# Patient Record
Sex: MALE | Race: BLACK OR AFRICAN AMERICAN | Employment: OTHER | URBAN - METROPOLITAN AREA
[De-identification: names, ages, dates, MRNs, and addresses within clinical notes are randomized per-mention and may not be internally consistent; named-entity substitution may affect disease eponyms.]

---

## 2018-08-02 PROBLEM — Z86.39 HISTORY OF DIABETES MELLITUS: Status: ACTIVE | Noted: 2017-08-17

## 2018-08-02 PROBLEM — I10 ESSENTIAL HYPERTENSION: Status: ACTIVE | Noted: 2018-05-05

## 2018-08-02 PROBLEM — H93.19 TINNITUS: Status: ACTIVE | Noted: 2017-08-17

## 2018-08-02 PROBLEM — R71.8 RBC MICROCYTOSIS: Status: ACTIVE | Noted: 2017-03-15

## 2018-08-02 PROBLEM — D50.9 MICROCYTIC ANEMIA: Status: ACTIVE | Noted: 2018-05-02

## 2018-08-02 PROBLEM — S76.102S: Status: ACTIVE | Noted: 2018-05-11

## 2018-08-02 PROBLEM — I42.2 ASYMMETRIC SEPTAL HYPERTROPHY (HCC): Status: ACTIVE | Noted: 2018-05-23

## 2018-08-02 PROBLEM — Z98.84 HISTORY OF GASTRIC BYPASS: Status: ACTIVE | Noted: 2018-05-02

## 2018-08-02 PROBLEM — Z86.39 HISTORY OF SECONDARY HYPERPARATHYROIDISM: Status: ACTIVE | Noted: 2018-05-02

## 2018-08-02 PROBLEM — E55.9 VITAMIN D DEFICIENCY: Status: ACTIVE | Noted: 2018-05-02

## 2018-08-02 PROBLEM — R73.03 PREDIABETES: Status: ACTIVE | Noted: 2017-08-17

## 2018-08-02 PROBLEM — Z96.651 HISTORY OF TOTAL KNEE REPLACEMENT, RIGHT: Status: ACTIVE | Noted: 2018-05-02

## 2018-08-02 PROBLEM — I51.7 BIATRIAL ENLARGEMENT: Status: ACTIVE | Noted: 2018-05-23

## 2018-08-02 PROBLEM — N25.81 SECONDARY RENAL HYPERPARATHYROIDISM (HCC): Status: ACTIVE | Noted: 2017-11-16

## 2018-08-02 PROBLEM — I27.20 MILD PULMONARY HYPERTENSION (HCC): Status: ACTIVE | Noted: 2018-05-23

## 2018-08-02 PROBLEM — F32.5 MAJOR DEPRESSIVE DISORDER IN FULL REMISSION (HCC): Status: ACTIVE | Noted: 2017-08-17

## 2018-08-02 PROBLEM — N20.0 NEPHROLITHIASIS: Status: ACTIVE | Noted: 2017-08-17

## 2018-08-02 PROBLEM — R55 SYNCOPE: Status: ACTIVE | Noted: 2018-05-05

## 2018-08-02 PROBLEM — F14.11 HISTORY OF COCAINE ABUSE (HCC): Status: ACTIVE | Noted: 2018-05-02

## 2018-08-02 PROBLEM — Z86.79 HISTORY OF ATRIAL FLUTTER: Status: ACTIVE | Noted: 2018-05-02

## 2018-08-02 PROBLEM — I36.1 NON-RHEUMATIC TRICUSPID VALVE INSUFFICIENCY: Status: ACTIVE | Noted: 2018-05-23

## 2018-08-02 PROBLEM — F12.90 MARIJUANA USE: Status: ACTIVE | Noted: 2017-03-15

## 2018-08-02 PROBLEM — Z87.891 HISTORY OF TOBACCO ABUSE: Status: ACTIVE | Noted: 2018-05-02

## 2018-08-02 PROBLEM — F10.11 HISTORY OF ALCOHOL ABUSE: Status: ACTIVE | Noted: 2018-05-02

## 2018-08-02 PROBLEM — G44.209 TENSION TYPE HEADACHE: Status: ACTIVE | Noted: 2017-08-17

## 2018-08-02 PROBLEM — E11.9 CONTROLLED TYPE 2 DIABETES MELLITUS WITHOUT COMPLICATION, WITHOUT LONG-TERM CURRENT USE OF INSULIN (HCC): Status: ACTIVE | Noted: 2018-05-02

## 2018-08-02 PROBLEM — F10.10 ALCOHOL ABUSE: Status: ACTIVE | Noted: 2018-05-05

## 2018-08-02 PROBLEM — R80.9 URINE TEST POSITIVE FOR MICROALBUMINURIA: Status: ACTIVE | Noted: 2018-05-03

## 2018-08-02 PROBLEM — D35.00 ADRENAL ADENOMA: Status: ACTIVE | Noted: 2018-05-11

## 2019-07-16 PROBLEM — I48.0 PAROXYSMAL ATRIAL FIBRILLATION (HCC): Status: ACTIVE | Noted: 2019-01-21

## 2019-07-16 PROBLEM — C64.2 RENAL CELL CARCINOMA OF LEFT KIDNEY (HCC): Status: ACTIVE | Noted: 2019-03-27

## 2019-07-16 PROBLEM — E61.1 IRON DEFICIENCY: Status: ACTIVE | Noted: 2019-02-08

## 2020-07-20 ENCOUNTER — HOSPITAL ENCOUNTER (OUTPATIENT)
Dept: PREADMISSION TESTING | Age: 70
Discharge: HOME | End: 2020-07-20
Payer: MEDICARE

## 2020-07-20 DIAGNOSIS — Z01.818 PRE-OP TESTING: ICD-10-CM

## 2020-07-20 PROCEDURE — 87635 SARS-COV-2 COVID-19 AMP PRB: CPT

## 2020-07-21 NOTE — PERIOP NOTES
PAT - SURGICAL PRE-ADMISSION INSTRUCTIONS    NAME:  Kale Wall                                                          TODAY'S DATE:  7/21/2020    SURGERY DATE:  7/23/2020                                  SURGERY ARRIVAL TIME:   1200 TBV    1. Do NOT eat or drink anything, including candy or gum, after MIDNIGHT on 7/22/20 , unless you have specific instructions from your Surgeon or Anesthesia Provider to do so. 2. No smoking on the day of surgery. 3. No alcohol 24 hours prior to the day of surgery. 4. No recreational drugs for one week prior to the day of surgery. 5. Leave all valuables, including money/purse, at home. 6. Remove all jewelry, nail polish, makeup (including mascara); no lotions, powders, deodorant, or perfume/cologne/after shave. 7. Glasses/Contact lenses and Dentures may be worn to the hospital.  They will be removed prior to surgery. 8. Call your doctor if symptoms of a cold or illness develop within 24 ours prior to surgery. 9. AN ADULT MUST DRIVE YOU HOME AFTER OUTPATIENT SURGERY. 10. If you are having an OUTPATIENT procedure, please make arrangements for a responsible adult to be with you for 24 hours after your surgery. 11. If you are admitted to the hospital, you will be assigned to a bed after surgery is complete. Normally a family member will not be able to see you until you are in your assigned bed. 15. Family is encouraged to accompany you to the hospital.  We ask visitors in the treatment area to be limited to ONE person at a time to ensure patient privacy. EXCEPTIONS WILL BE MADE AS NEEDED. 15. Children under 12 are discouraged from entering the treatment area and need to be supervised by an adult when in the waiting room. Special Instructions:     Take these medications the morning of surgery with a sip of water:  AMLODIPINE, PRILOSEC, PROZAC, STOP anticoagulants AT LEAST 1 WEEK PRIOR to your surgery or, follow other MD instructions:  COUMADIN    Patient Prep:    shower with anti-bacterial soap    These surgical instructions were reviewed with PATIENT'S WIFE during the PAT PHONE CALL. Directions: On the morning of surgery, please go to the MAIN ENTRANCE. Sign in at the Registration Desk.     If you have any questions and/or concerns, please do not hesitate to call:  (Prior to the day of surgery)  [facility] unit:  [phone]  (Day of surgery)  [facility] unit:  [phone]

## 2020-07-22 LAB — SARS-COV-2, COV2NT: NOT DETECTED

## 2020-07-23 ENCOUNTER — APPOINTMENT (OUTPATIENT)
Dept: GENERAL RADIOLOGY | Age: 70
End: 2020-07-23
Attending: UROLOGY
Payer: MEDICARE

## 2020-07-23 ENCOUNTER — ANESTHESIA EVENT (OUTPATIENT)
Dept: SURGERY | Age: 70
End: 2020-07-23
Payer: MEDICARE

## 2020-07-23 ENCOUNTER — HOSPITAL ENCOUNTER (OUTPATIENT)
Age: 70
Setting detail: OUTPATIENT SURGERY
Discharge: HOME | End: 2020-07-23
Attending: UROLOGY | Admitting: UROLOGY
Payer: MEDICARE

## 2020-07-23 ENCOUNTER — ANESTHESIA (OUTPATIENT)
Dept: SURGERY | Age: 70
End: 2020-07-23
Payer: MEDICARE

## 2020-07-23 VITALS
RESPIRATION RATE: 24 BRPM | DIASTOLIC BLOOD PRESSURE: 80 MMHG | OXYGEN SATURATION: 95 % | HEART RATE: 62 BPM | HEIGHT: 68 IN | SYSTOLIC BLOOD PRESSURE: 133 MMHG | TEMPERATURE: 98.7 F | BODY MASS INDEX: 30.21 KG/M2 | WEIGHT: 199.31 LBS

## 2020-07-23 DIAGNOSIS — N20.0 NEPHROLITHIASIS: Primary | ICD-10-CM

## 2020-07-23 LAB
INR PPP: 1.2 (ref 0.8–1.2)
PROTHROMBIN TIME: 15.3 SEC (ref 11.5–15.2)

## 2020-07-23 PROCEDURE — C1894 INTRO/SHEATH, NON-LASER: HCPCS | Performed by: UROLOGY

## 2020-07-23 PROCEDURE — 74011250636 HC RX REV CODE- 250/636: Performed by: UROLOGY

## 2020-07-23 PROCEDURE — 74011250636 HC RX REV CODE- 250/636: Performed by: NURSE ANESTHETIST, CERTIFIED REGISTERED

## 2020-07-23 PROCEDURE — 77030032490 HC SLV COMPR SCD KNE COVD -B: Performed by: UROLOGY

## 2020-07-23 PROCEDURE — 77030008477 HC STYL SATN SLP COVD -A: Performed by: ANESTHESIOLOGY

## 2020-07-23 PROCEDURE — 76210000063 HC OR PH I REC FIRST 0.5 HR: Performed by: UROLOGY

## 2020-07-23 PROCEDURE — 85610 PROTHROMBIN TIME: CPT

## 2020-07-23 PROCEDURE — 74011000250 HC RX REV CODE- 250: Performed by: NURSE ANESTHETIST, CERTIFIED REGISTERED

## 2020-07-23 PROCEDURE — 74011636320 HC RX REV CODE- 636/320: Performed by: UROLOGY

## 2020-07-23 PROCEDURE — 76210000020 HC REC RM PH II FIRST 0.5 HR: Performed by: UROLOGY

## 2020-07-23 PROCEDURE — 76010000162 HC OR TIME 1.5 TO 2 HR INTENSV-TIER 1: Performed by: UROLOGY

## 2020-07-23 PROCEDURE — 77030008683 HC TU ET CUF COVD -A: Performed by: ANESTHESIOLOGY

## 2020-07-23 PROCEDURE — 74420 UROGRAPHY RTRGR +-KUB: CPT

## 2020-07-23 PROCEDURE — C1758 CATHETER, URETERAL: HCPCS | Performed by: UROLOGY

## 2020-07-23 PROCEDURE — C1769 GUIDE WIRE: HCPCS | Performed by: UROLOGY

## 2020-07-23 PROCEDURE — 77030013079 HC BLNKT BAIR HGGR 3M -A: Performed by: ANESTHESIOLOGY

## 2020-07-23 PROCEDURE — 74011250636 HC RX REV CODE- 250/636: Performed by: ANESTHESIOLOGY

## 2020-07-23 PROCEDURE — 76060000034 HC ANESTHESIA 1.5 TO 2 HR: Performed by: UROLOGY

## 2020-07-23 PROCEDURE — 74011000272 HC RX REV CODE- 272: Performed by: UROLOGY

## 2020-07-23 PROCEDURE — C1892 INTRO/SHEATH,FIXED,PEEL-AWAY: HCPCS | Performed by: UROLOGY

## 2020-07-23 PROCEDURE — 77030018836 HC SOL IRR NACL ICUM -A: Performed by: UROLOGY

## 2020-07-23 PROCEDURE — 74011000258 HC RX REV CODE- 258: Performed by: UROLOGY

## 2020-07-23 PROCEDURE — C2617 STENT, NON-COR, TEM W/O DEL: HCPCS | Performed by: UROLOGY

## 2020-07-23 RX ORDER — LIDOCAINE HYDROCHLORIDE 20 MG/ML
INJECTION, SOLUTION EPIDURAL; INFILTRATION; INTRACAUDAL; PERINEURAL AS NEEDED
Status: DISCONTINUED | OUTPATIENT
Start: 2020-07-23 | End: 2020-07-23 | Stop reason: HOSPADM

## 2020-07-23 RX ORDER — DEXAMETHASONE SODIUM PHOSPHATE 4 MG/ML
INJECTION, SOLUTION INTRA-ARTICULAR; INTRALESIONAL; INTRAMUSCULAR; INTRAVENOUS; SOFT TISSUE AS NEEDED
Status: DISCONTINUED | OUTPATIENT
Start: 2020-07-23 | End: 2020-07-23 | Stop reason: HOSPADM

## 2020-07-23 RX ORDER — DOCUSATE SODIUM 100 MG/1
100 CAPSULE, LIQUID FILLED ORAL
Qty: 60 CAP | Refills: 2 | Status: SHIPPED | OUTPATIENT
Start: 2020-07-23 | End: 2020-10-21

## 2020-07-23 RX ORDER — ONDANSETRON 2 MG/ML
4 INJECTION INTRAMUSCULAR; INTRAVENOUS
Status: DISCONTINUED | OUTPATIENT
Start: 2020-07-23 | End: 2020-07-23 | Stop reason: HOSPADM

## 2020-07-23 RX ORDER — MIDAZOLAM HYDROCHLORIDE 1 MG/ML
INJECTION, SOLUTION INTRAMUSCULAR; INTRAVENOUS AS NEEDED
Status: DISCONTINUED | OUTPATIENT
Start: 2020-07-23 | End: 2020-07-23 | Stop reason: HOSPADM

## 2020-07-23 RX ORDER — OXYCODONE AND ACETAMINOPHEN 5; 325 MG/1; MG/1
1-2 TABLET ORAL
Qty: 10 TAB | Refills: 0 | Status: SHIPPED | OUTPATIENT
Start: 2020-07-23 | End: 2020-08-02

## 2020-07-23 RX ORDER — SODIUM CHLORIDE, SODIUM LACTATE, POTASSIUM CHLORIDE, CALCIUM CHLORIDE 600; 310; 30; 20 MG/100ML; MG/100ML; MG/100ML; MG/100ML
INJECTION, SOLUTION INTRAVENOUS
Status: DISCONTINUED | OUTPATIENT
Start: 2020-07-23 | End: 2020-07-23 | Stop reason: HOSPADM

## 2020-07-23 RX ORDER — SODIUM CHLORIDE, SODIUM LACTATE, POTASSIUM CHLORIDE, CALCIUM CHLORIDE 600; 310; 30; 20 MG/100ML; MG/100ML; MG/100ML; MG/100ML
125 INJECTION, SOLUTION INTRAVENOUS ONCE
Status: COMPLETED | OUTPATIENT
Start: 2020-07-23 | End: 2020-07-23

## 2020-07-23 RX ORDER — FENTANYL CITRATE 50 UG/ML
50 INJECTION, SOLUTION INTRAMUSCULAR; INTRAVENOUS
Status: DISCONTINUED | OUTPATIENT
Start: 2020-07-23 | End: 2020-07-23 | Stop reason: HOSPADM

## 2020-07-23 RX ORDER — ROCURONIUM BROMIDE 10 MG/ML
INJECTION, SOLUTION INTRAVENOUS AS NEEDED
Status: DISCONTINUED | OUTPATIENT
Start: 2020-07-23 | End: 2020-07-23 | Stop reason: HOSPADM

## 2020-07-23 RX ORDER — FENTANYL CITRATE 50 UG/ML
INJECTION, SOLUTION INTRAMUSCULAR; INTRAVENOUS AS NEEDED
Status: DISCONTINUED | OUTPATIENT
Start: 2020-07-23 | End: 2020-07-23 | Stop reason: HOSPADM

## 2020-07-23 RX ORDER — ONDANSETRON 2 MG/ML
INJECTION INTRAMUSCULAR; INTRAVENOUS AS NEEDED
Status: DISCONTINUED | OUTPATIENT
Start: 2020-07-23 | End: 2020-07-23 | Stop reason: HOSPADM

## 2020-07-23 RX ORDER — SODIUM CHLORIDE, SODIUM LACTATE, POTASSIUM CHLORIDE, CALCIUM CHLORIDE 600; 310; 30; 20 MG/100ML; MG/100ML; MG/100ML; MG/100ML
75 INJECTION, SOLUTION INTRAVENOUS CONTINUOUS
Status: DISCONTINUED | OUTPATIENT
Start: 2020-07-23 | End: 2020-07-23 | Stop reason: HOSPADM

## 2020-07-23 RX ORDER — PROPOFOL 10 MG/ML
INJECTION, EMULSION INTRAVENOUS AS NEEDED
Status: DISCONTINUED | OUTPATIENT
Start: 2020-07-23 | End: 2020-07-23 | Stop reason: HOSPADM

## 2020-07-23 RX ORDER — SUCCINYLCHOLINE CHLORIDE 100 MG/5ML
SYRINGE (ML) INTRAVENOUS AS NEEDED
Status: DISCONTINUED | OUTPATIENT
Start: 2020-07-23 | End: 2020-07-23 | Stop reason: HOSPADM

## 2020-07-23 RX ADMIN — DEXAMETHASONE SODIUM PHOSPHATE 8 MG: 4 INJECTION, SOLUTION INTRA-ARTICULAR; INTRALESIONAL; INTRAMUSCULAR; INTRAVENOUS; SOFT TISSUE at 16:33

## 2020-07-23 RX ADMIN — SODIUM CHLORIDE, SODIUM LACTATE, POTASSIUM CHLORIDE, AND CALCIUM CHLORIDE: 600; 310; 30; 20 INJECTION, SOLUTION INTRAVENOUS at 16:21

## 2020-07-23 RX ADMIN — Medication 80 MG: at 16:27

## 2020-07-23 RX ADMIN — PROPOFOL 50 MG: 10 INJECTION, EMULSION INTRAVENOUS at 17:11

## 2020-07-23 RX ADMIN — ONDANSETRON 4 MG: 2 SOLUTION INTRAMUSCULAR; INTRAVENOUS at 17:44

## 2020-07-23 RX ADMIN — SUGAMMADEX 181 MG: 100 INJECTION, SOLUTION INTRAVENOUS at 17:45

## 2020-07-23 RX ADMIN — ROCURONIUM BROMIDE 20 MG: 10 SOLUTION INTRAVENOUS at 16:53

## 2020-07-23 RX ADMIN — ROCURONIUM BROMIDE 30 MG: 10 SOLUTION INTRAVENOUS at 17:11

## 2020-07-23 RX ADMIN — SODIUM CHLORIDE, SODIUM LACTATE, POTASSIUM CHLORIDE, AND CALCIUM CHLORIDE 125 ML/HR: 600; 310; 30; 20 INJECTION, SOLUTION INTRAVENOUS at 12:04

## 2020-07-23 RX ADMIN — ROCURONIUM BROMIDE 30 MG: 10 SOLUTION INTRAVENOUS at 16:34

## 2020-07-23 RX ADMIN — MIDAZOLAM 2 MG: 1 INJECTION INTRAMUSCULAR; INTRAVENOUS at 16:21

## 2020-07-23 RX ADMIN — GENTAMICIN SULFATE 400 MG: 40 INJECTION, SOLUTION INTRAMUSCULAR; INTRAVENOUS at 16:35

## 2020-07-23 RX ADMIN — SODIUM CHLORIDE, SODIUM LACTATE, POTASSIUM CHLORIDE, AND CALCIUM CHLORIDE: 600; 310; 30; 20 INJECTION, SOLUTION INTRAVENOUS at 17:26

## 2020-07-23 RX ADMIN — LIDOCAINE HYDROCHLORIDE 80 MG: 20 INJECTION, SOLUTION INTRAVENOUS at 16:27

## 2020-07-23 RX ADMIN — FENTANYL CITRATE 50 MCG: 50 INJECTION, SOLUTION INTRAMUSCULAR; INTRAVENOUS at 16:26

## 2020-07-23 RX ADMIN — FENTANYL CITRATE 50 MCG: 50 INJECTION, SOLUTION INTRAMUSCULAR; INTRAVENOUS at 16:21

## 2020-07-23 RX ADMIN — FENTANYL CITRATE 100 MCG: 50 INJECTION, SOLUTION INTRAMUSCULAR; INTRAVENOUS at 17:12

## 2020-07-23 RX ADMIN — PROPOFOL 150 MG: 10 INJECTION, EMULSION INTRAVENOUS at 16:27

## 2020-07-23 NOTE — PERIOP NOTES
Pre-Op Summary    Pt arrived via car with family/friend and is oriented to time, place, person and situation. Patient with steady gait with none assistive devices. Visit Vitals  /84 (BP 1 Location: Left arm, BP Patient Position: At rest)   Pulse 62   Temp 98.4 °F (36.9 °C)   Resp 18   Ht 5' 8\" (1.727 m)   Wt 90.4 kg (199 lb 5 oz)   SpO2 100%   BMI 30.31 kg/m²       Peripheral IV located on Left wrist .    Patients belongings are locked up on [facility]. Patient's point of contact is his wife Keren Mccrary and their contact number is: [phone]. They will be leaving and coming back. They are able to receive medication information. They will be their ride home.

## 2020-07-23 NOTE — BRIEF OP NOTE
Brief Postoperative Note    Patient: Campbell Coffman  YOB: 1950  MRN: [id number]    Date of Procedure: 7/23/2020     Pre-Op Diagnosis: Kidney Stone N20.0    Post-Op Diagnosis: Same as preoperative diagnosis. Procedure(s):  Cystoscopy, Left Retrograde Pyelogram, Left Ureteroscopy Laser Lithotripsy, Left Stent Placement    Surgeon:   Anirudh Recinos MD    Assistant: Neisha Urias MD, RES    Anesthesia: General     Estimated Blood Loss (mL): Minimal    Complications: None    Specimens:None    Implants:   Implant Name Type Inv.  Item Serial No.  Lot No. LRB No. Used Action   [employer] [id number]    [employer] [id number] Left 1 Implanted       Drains: None    Findings: Per OP report    Electronically Signed by Kezia Knowles MD on 7/23/2020 at 5:55 PM

## 2020-07-23 NOTE — ANESTHESIA PREPROCEDURE EVALUATION
Relevant Problems   No relevant active problems       Anesthetic History               Review of Systems / Medical History  Patient summary reviewed and pertinent labs reviewed    Pulmonary    COPD: moderate    Sleep apnea: CPAP           Neuro/Psych              Cardiovascular    Hypertension: well controlled        Dysrhythmias            GI/Hepatic/Renal     GERD: well controlled    Renal disease: stones       Endo/Other      Hypothyroidism  Morbid obesity and cancer     Other Findings              Physical Exam    Airway  Mallampati: III  TM Distance: 4 - 6 cm  Neck ROM: decreased range of motion   Mouth opening: Diminished (comment)     Cardiovascular    Rhythm: regular  Rate: normal         Dental    Dentition: Poor dentition     Pulmonary  Breath sounds clear to auscultation               Abdominal         Other Findings            Anesthetic Plan    ASA: 3  Anesthesia type: general          Induction: Intravenous  Anesthetic plan and risks discussed with: Patient

## 2020-07-23 NOTE — PERIOP NOTES
Transferred to PACU holding to await surgery. Seen by Dr. Leeann Santana. Report given to ERICH Scherer RN. LAst Coumadin dose 7/20/2020.

## 2020-07-23 NOTE — DISCHARGE INSTRUCTIONS
Patient Education        Kidney Stone: Care Instructions  Your Care Instructions     Kidney stones are formed when salts, minerals, and other substances normally found in the urine clump together. They can be as small as grains of sand or, rarely, as large as golf balls. While the stone is traveling through the ureter, which is the tube that carries urine from the kidney to the bladder, you will probably feel pain. The pain may be mild or very severe. You may also have some blood in your urine. As soon as the stone reaches the bladder, any intense pain should go away. If a stone is too large to pass on its own, you may need a medical procedure to help you pass the stone. The doctor has checked you carefully, but problems can develop later. If you notice any problems or new symptoms, get medical treatment right away. Follow-up care is a key part of your treatment and safety. Be sure to make and go to all appointments, and call your doctor if you are having problems. It's also a good idea to know your test results and keep a list of the medicines you take. How can you care for yourself at home? · Drink plenty of fluids, enough so that your urine is light yellow or clear like water. If you have kidney, heart, or liver disease and have to limit fluids, talk with your doctor before you increase the amount of fluids you drink. · Take pain medicines exactly as directed. Call your doctor if you think you are having a problem with your medicine. ? If the doctor gave you a prescription medicine for pain, take it as prescribed. ? If you are not taking a prescription pain medicine, ask your doctor if you can take an over-the-counter medicine. Read and follow all instructions on the label. · Your doctor may ask you to strain your urine so that you can collect your kidney stone when it passes. You can use a kitchen strainer or a tea strainer to catch the stone.  Store it in a plastic bag until you see your doctor again.  Preventing future kidney stones  Some changes in your diet may help prevent kidney stones. Depending on the cause of your stones, your doctor may recommend that you:  · Drink plenty of fluids, enough so that your urine is light yellow or clear like water. If you have kidney, heart, or liver disease and have to limit fluids, talk with your doctor before you increase the amount of fluids you drink. · Limit coffee, tea, and alcohol. Also avoid grapefruit juice. · Do not take more than the recommended daily dose of vitamins C and D.  · Avoid antacids such as Gaviscon, Maalox, Mylanta, or Tums. · Limit the amount of salt (sodium) in your diet. · Eat a balanced diet that is not too high in protein. · Limit foods that are high in a substance called oxalate, which can cause kidney stones. These foods include dark green vegetables, rhubarb, chocolate, wheat bran, nuts, cranberries, and beans. When should you call for help? Call your doctor now or seek immediate medical care if:  · You cannot keep down fluids. · Your pain gets worse. · You have a fever or chills. · You have new or worse pain in your back just below your rib cage (the flank area). · You have new or more blood in your urine. Watch closely for changes in your health, and be sure to contact your doctor if:    Patient Education        Kidney Stone: Care Instructions  Your Care Instructions     Kidney stones are formed when salts, minerals, and other substances normally found in the urine clump together. They can be as small as grains of sand or, rarely, as large as golf balls. While the stone is traveling through the ureter, which is the tube that carries urine from the kidney to the bladder, you will probably feel pain. The pain may be mild or very severe. You may also have some blood in your urine. As soon as the stone reaches the bladder, any intense pain should go away.   If a stone is too large to pass on its own, you may need a medical procedure to help you pass the stone. The doctor has checked you carefully, but problems can develop later. If you notice any problems or new symptoms, get medical treatment right away. Follow-up care is a key part of your treatment and safety. Be sure to make and go to all appointments, and call your doctor if you are having problems. It's also a good idea to know your test results and keep a list of the medicines you take. How can you care for yourself at home? · Drink plenty of fluids, enough so that your urine is light yellow or clear like water. If you have kidney, heart, or liver disease and have to limit fluids, talk with your doctor before you increase the amount of fluids you drink. · Take pain medicines exactly as directed. Call your doctor if you think you are having a problem with your medicine. ? If the doctor gave you a prescription medicine for pain, take it as prescribed. ? If you are not taking a prescription pain medicine, ask your doctor if you can take an over-the-counter medicine. Read and follow all instructions on the label. · Your doctor may ask you to strain your urine so that you can collect your kidney stone when it passes. You can use a kitchen strainer or a tea strainer to catch the stone. Store it in a plastic bag until you see your doctor again. Preventing future kidney stones  Some changes in your diet may help prevent kidney stones. Depending on the cause of your stones, your doctor may recommend that you:  · Drink plenty of fluids, enough so that your urine is light yellow or clear like water. If you have kidney, heart, or liver disease and have to limit fluids, talk with your doctor before you increase the amount of fluids you drink. · Limit coffee, tea, and alcohol. Also avoid grapefruit juice. · Do not take more than the recommended daily dose of vitamins C and D.  · Avoid antacids such as Gaviscon, Maalox, Mylanta, or Tums.   · Limit the amount of salt (sodium) in your diet.  · Eat a balanced diet that is not too high in protein. · Limit foods that are high in a substance called oxalate, which can cause kidney stones. These foods include dark green vegetables, rhubarb, chocolate, wheat bran, nuts, cranberries, and beans. When should you call for help? Call your doctor now or seek immediate medical care if:  · You cannot keep down fluids. · Your pain gets worse. · You have a fever or chills. · You have new or worse pain in your back just below your rib cage (the flank area). · You have new or more blood in your urine. Watch closely for changes in your health, and be sure to contact your doctor if:  · You do not get better as expected. Where can you learn more? Go to http://radhaListiavenus.info/  Enter S137 in the search box to learn more about \"Kidney Stone: Care Instructions. \"  Current as of: August 12, 2019               Content Version: 12.5  © 8708-5138 [employer]. Care instructions adapted under license by [employer] (which disclaims liability or warranty for this information). If you have questions about a medical condition or this instruction, always ask your healthcare professional. [place] [address] any warranty or liability for your use of this information. DISCHARGE SUMMARY from Nurse    PATIENT INSTRUCTIONS:    After general anesthesia or intravenous sedation, for 24 hours or while taking prescription Narcotics:  · Limit your activities  · Do not drive and operate hazardous machinery  · Do not make important personal or business decisions  · Do  not drink alcoholic beverages  · If you have not urinated within 8 hours after discharge, please contact your surgeon on call.     Report the following to your surgeon:  · Excessive pain, swelling, redness or odor of or around the surgical area  · Temperature over 100.5  · Nausea and vomiting lasting longer than 4 hours or if unable to take medications  · Any signs of decreased circulation or nerve impairment to extremity: change in color, persistent  numbness, tingling, coldness or increase pain  · Any questions    What to do at Home:  Recommended activity: Activity as tolerated,     If you experience any of the following symptoms fever & inability to void- go to the nearest emergency room, please follow up with Dr. Janet Salazar in 2 weeks. Use the strainer and collect the kidney stones and bring to your appointment with Dr. Janet Salazar. *  Please give a list of your current medications to your Primary Care Provider. *  Please update this list whenever your medications are discontinued, doses are      changed, or new medications (including over-the-counter products) are added. *  Please carry medication information at all times in case of emergency situations. These are general instructions for a healthy lifestyle:    No smoking/ No tobacco products/ Avoid exposure to second hand smoke  Surgeon General's Warning:  Quitting smoking now greatly reduces serious risk to your health. Obesity, smoking, and sedentary lifestyle greatly increases your risk for illness    A healthy diet, regular physical exercise & weight monitoring are important for maintaining a healthy lifestyle    You may be retaining fluid if you have a history of heart failure or if you experience any of the following symptoms:  Weight gain of 3 pounds or more overnight or 5 pounds in a week, increased swelling in our hands or feet or shortness of breath while lying flat in bed. Please call your doctor as soon as you notice any of these symptoms; do not wait until your next office visit. The discharge information has been reviewed with the patient. The patient verbalized understanding.   Discharge medications reviewed with the patient and appropriate educational materials and side effects teaching were provided.   ___________________________________________________________________________________________________________________________________

## 2020-07-23 NOTE — H&P
Gloria Garcia   1950  78 y.o.  20     ASSESSMENT:            Encounter Diagnoses       ICD-10-CM ICD-9-CM   1. Kidney stone N20.0 592.0      1.  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 [id number](R0) ccRCC (grade 2) s/p right partial nephrectomy 2019. CT A/P and CXR 2020 with no evidence of recurrence or metastasis     3. BPH with irritative LUTS, on Flomax 0.4 mg daily. Cysto 18 with Dr. Vásquez Courser showed Trilobar BPH w/ median lobe and prostate varices               Repeat cysto 18 showed very large median lobe. TRUS volume of 95 cc. S/p GreenLight PVP on 18.      4.  Gross hematuria with h/o E coli UTI on 18              Cuellar cath placed 18 and hematuria cleared with CBI. Cysto 18 with Dr. Vásquez Courser showed Trilobar BPH w/ median lobe and prostate varices. Hematuria likely secondary to large prostate gland.      5. LLP 8-9 mm non obstructing stone per CT 18. KUB 18 unable to visualize stone. KUB 10/2/18: Lower pole left kidney stone measures 9 mm. 1 mm stone interpolar region right kidney              CT on 2019: Lower pole left kidney stone measures 11 mm with additional 3 mm adjacent stone              CT on 20: not commented on by radiology, however stone stable in size (11mm) but appears to have migrated to renal pelvis. Patient asymptomatic. KUB 20 - Left nephrolithiasis.  1.1cm mid pole and 3mm lower pole stone      5.   Right adrenal nodule measuring 2.2 cm              Stable on CT on 8/10/2018 and CT 20, consistent with adenoma              Evaluated by Dr Ermias Bennett for subclinical Cushings syndrome, no longer a concern per recent office note with f/u PRN     6.  Fatigue, low libido, erectile dysfunction with testosterone 11/27/2018 - 568                7. Chronically anticoagulated on Coumadin     Plan:   Urine sent for culture, will treat accordingly   KUB today - Left nephrolithiasis.  1.1cm mid pole and 3mm lower pole stone  Reviewed report/images from CT A/P 2/25/20 - LLP stone appears to have moved into renal pelvis. Stable in size at 11mm  Discussed treatment options for LLP include ESWL vs Ureteroscopy with laser lithotripsy. Reviewed R/B of both  Patient opting to proceed with URS with LL. Surgery letter sent. Will need clearance from PCP to hold coumadin  Will repeat imaging to monitor for recurrence of RCC in 2/2021  Continue Flomax 0.4mg   To OR for cysto, left ureteroscopy with laser lithotripsy, left RPG, and left ureteral stent placement           DISCUSSION:  We discussed the options for management of kidney stones, including observation, ESWL, ureteroscopy with laser lithotripsy, and PCNL. The risks and benefits of each option were discussed and patient desires management     ESWL: risks and benefits of ESWL were outlined including infection, bleeding, pain, steinstrasse, kidney injury, need for ancillary treatments, and global anesthesia risks.     Ureteroscopy: risks and benefits of ureteroscopy were outlined, including infection, bleeding, pain, temporary ureteral stent and associated stent bother, ureteral injury, ureteral stricture, need for ancillary treatments, and global anesthesia risks.         Chief Complaint   Patient presents with    Kidney Stone        HISTORY OF PRESENT ILLNESS:  Sharon Cavazos is a 78 y.o. male who presents today in follow-up to discuss treatment for kidney stones.     Patient has had a stable left lower pole stone seen on imaging for the last couple of years. His most recent CT on 2/25/20 appeared that the stone may have migrated into the renal pelvis.  Patient denies any flank pain.      KUB today demonstrated a 1.1cm left mid pole and 3mm left lower pole stone. Patient accompanied by his wife today. Denies any bothersome urinary symptoms. Denies gross hematuria. He reports he has passed stones in the past but never required intervention.      Patient on coumadin, RXed by PCP.      Other hx:     He also has a history of [id number] clear cell RCC x2. Patient initially had a CT on 8/10/18 that revealed a right 2.2 cm renal mass and left 1.9 cm renal mass. S/p CT guided biopsy of left renal mass on 9/17/18, pathology: clear cell RCC. Brother had bilateral kidney cancer and s/p partial nephrectomy. Reports other family members with prostate cancer.      Patient also with a history of a right adrenal mass. First seen on CT on 5/2018 and stable with most recent CT on 2/15/2019 at 1.9 cm. He has completed evaluation with endocrinology with negative work-up and no further concern for Cushings.     History of gastric bypass, cardiac ablation           Past Medical History:   Diagnosis Date    Adrenal nodule (HCC)      Arrhythmia       Atrial Fib. .... [facility]  cardioversion x1 ,  pt converted , to NSR  3-4y ago    Benign non-nodular prostatic hyperplasia with lower urinary tract symptoms      CHF (congestive heart failure) (HCC)       pt is stable    CKD (chronic kidney disease)      Dyslipidemia      Dysuria      Heart murmur      Hematuria      History of gross hematuria      HTN (hypertension)      Kidney stone on left side      Nocturia      Prostate varices      Renal cyst      Renal mass, left      Renal mass, right      Sleep apnea 1992     USE CPAP   PRN only,  used CPAP x first 8 yrs    Urinary frequency      Urinary urgency                Past Surgical History:   Procedure Laterality Date    HX KNEE REPLACEMENT Right      HX ORTHOPAEDIC Right       Rotator cuff  right arm        Social History            Tobacco Use    Smoking status: Former Smoker       Packs/day: 1.00       Years: 10.00       Pack years: 10.00       Types: Cigarettes       Last attempt to quit:        Years since quittin.4    Smokeless tobacco: Former User    Tobacco comment: pt was an ALCOHOLIC,   stopped 30 yrs ago   Substance Use Topics    Alcohol use: Yes       Alcohol/week: 10.0 standard drinks       Types: 10 Cans of beer per week    Drug use: No       Comment: pt was an addict, many yrs back,  recovered.              Allergies   Allergen Reactions    Ibuprofen Other (comments)       Causes kidney's to shut down        Family History   Family history unknown: Yes               Current Outpatient Medications   Medication Sig Dispense Refill    amLODIPine (NORVASC) 10 mg tablet Take 10 mg by mouth daily.        mirtazapine (REMERON) 15 mg tablet TAKE ONE TABLET BY MOUTH AT BEDTIME        diclofenac (Voltaren) 1 % gel Apply  to affected area four (4) times daily.        multivitamin (ONE A DAY) tablet Take 1 Tab by mouth daily.        lisinopriL (PRINIVIL, ZESTRIL) 5 mg tablet          calcitRIOL (ROCALTROL) 0.25 mcg capsule          tamsulosin (FLOMAX) 0.4 mg capsule TAKE 1 CAPSULE BY MOUTH DAILY AFTER DINNER.  90 Cap 0    FLUoxetine (PROZAC) 20 mg tablet Take 20 mg by mouth daily.        warfarin (COUMADIN) 5 mg tablet 5 mg.        sildenafil, antihypertensive, (REVATIO) 20 mg tablet Take 1-5 po as needed 90 Tab 3    albuterol (PROVENTIL HFA, VENTOLIN HFA, PROAIR HFA) 90 mcg/actuation inhaler 2 Puffs.        atorvastatin (LIPITOR) 20 mg tablet 20 mg.        omega 3-dha-epa-fish oil 60- mg cap 500 mg.        omeprazole (PRILOSEC) 20 mg capsule              Review of Systems  Constitutional: Fever: No  Skin: Rash: No  HEENT: Hearing difficulty: No  Eyes: Blurred vision: No  Cardiovascular: Chest pain: No  Respiratory: Shortness of breath: No  Gastrointestinal: Nausea/vomiting: No  Musculoskeletal: Back pain: No  Neurological: Weakness: No  Psychological: Memory loss: No  Comments/additional findings:          PHYSICAL EXAMINATION:   Visit Vitals  Ht 5' 8\" (1.727 m)   Wt 184 lb (83.5 kg)   BMI 27.98 kg/m²     GEN: NAD, alert and oriented  PULM: nl resp effort, no distress  PSYCH: Nl mood and affect  BACK: No CVAT bilaterally     REVIEW OF LABS AND IMAGING:       Recent Results   No results found for this or any previous visit (from the past 12 hour(s)).        Surgical Pathology 8/21/2019  A) RIGHT RENAL MASS, PARTIAL NEPHRECTOMY:     - DIAGNOSIS: CLEAR CELL RENAL CELL CARCINOMA     - PROCEDURE: PARTIAL NEPHRECTOMY     - SPECIMEN LATERALITY: RIGHT     - FOCALITY: UNIFOCAL (CLEAR CELL RCC IN OPPOSITE KIDNEY, SEE COMMENT)     - TUMOR SITE: INTERPOLAR (FROM IMAGING STUDIES)     - TUMOR SIZE: 1.5 x 1.5 x 1.5 CM     - HISTOLOGIC TYPE: CLEAR CELL     - HISTOLOGIC GRADE (WHO/ISUP): 2/4     - TUMOR EXTENSION/ANATOMIC EXTENT OF TUMOR: CONFINED TO KIDNEY      - NECROSIS: NEGATIVE     - RHABDOID FEATURES: NEGATIVE     - SARCOMATOID FEATURES: NEGATIVE     - MARGINS:       - PARENCHYMAL: NEGATIVE    - LYMPHVASCULAR INVASION: NEGATIVE    - ADRENAL GLAND: NOT PRESENT/NOT APPLICABLE     - REGIONAL LYMPH NODES:         - NUMBER EXAMINED: NONE        - NUMBER INVOLVED: NOT APPLICABLE     - DISTANT METASTASIS: NOT SUSPECTED     - ADDITIONAL FINDINGS:  A 0.6 mm SEPARATE PAPILLARY ADENOMA IS NOTED IN SLIDE A1 WITHIN 0.1 mm OF THE INKED PARENCHYMAL MARGIN.  THESE LESIONS ARE GENERALLY REGARDED AS BENIGN.       - PATHOLOGIC FINDINGS IN NON-NEOPLASTIC KIDNEY: PERITUMORAL FIBROSIS; UNREMARKABLE GLOMERULI     - PATHOLOGIC STAGE: pT1aNx     Surgical Pathology 4/3/2019  Diagnosis PARTIAL NEPHRECTOMY, LEFT:   - RENAL CELL CARCINOMA   - TUMOR SIZE: 2.4 CM (MAXIMUM DIMENSION)   - TUMOR FOCALITY: UNIFOCAL   - HISTOLOGIC TYPE: CLEAR CELL RENAL CELL CARCINOMA   - SARCOMATOID FEATURES: NOT IDENTIFIED   - RHABDOID FEATURES: NOT IDENTIFIED   - HISTOLOGIC GRADE: G2   - TUMOR EXTENSION: LIMITED TO KIDNEY   - TUMOR NECROSIS: NOT PRESENT   - MARGINS: UNINVOLVED BY INVASIVE TUMOR   - REGIONAL LYMPH NODES: NOT SUBMITTED   - PATHOLOGIC STAGE CLASSIFICATION:   - PRIMARY TUMOR: pT1a   - REGIONAL NODES: pNx   - DISTANT METASTASIS: NOT APPLICABLE   - PATHOLOGIC FINDINGS IN NON-NEOPLASTIC KIDNEY: FOCAL CHRONIC INTERSTITIAL   INFLAMMATION; FOCAL GLOMERULOSCLEROSIS      PSA Trend        Lab Results   Component Value Date/Time     Prostate Specific Ag 0.907 05/10/2019     Prostate Specific Ag 2.930 04/17/2018      Comprehensive Metabolic Panel 4/85/5408     Component Name Value Ref Range   Potassium 4.9 3.5 - 5.5 mmol/L   Sodium 140 133 - 145 mmol/L   Chloride 102 98 - 110 mmol/L   Glucose 120 (H) 70 - 99 mg/dL   Calcium 9.4 8.4 - 10.5 mg/dL   Albumin 4.2 3.5 - 5 g/dL   SGPT (ALT) 92 (H) 5 - 40 U/L   SGOT (AST) 47 (H) 10 - 37 U/L   Bilirubin Total 0.3 0.2 - 1.2 mg/dL   Alkaline Phosphatase 84 40 - 125 U/L   BUN 32 (H) 6 - 22 mg/dL   CO2 28 20 - 32 mmol/L   Creatinine 1.3 0.8 - 1.6 mg/dL   eGFR African American >60.0 >60.0    eGFR Non  53.2 (L) >60.0    Globulin 2.8 2 - 4 g/dL   A/G Ratio 1.5 1.1 - 2.6 ratio   Total Protein 7.0 6.2 - 8.1 g/dL   Anion Gap 9.6 mmol/L         IMAGING:     KUB 6/23/2020  Impression:  Left nephrolithiasis.  1.1cm mid pole and 3mm lower pole stone     CT 2/25/20  Adrenal glands: 2.2 cm right adrenal mass unchanged. Left adrenal prominent but also stable  Kidneys: Previous bilateral segmental nephrectomies. There are a few sub centimeter low attenuation foci noted most likely representing small cysts but too small to confidently characterize. Negative otherwise  Lymph nodes: No lymphadenopathy.      IMPRESSION  Negative for recurrent or residual neoplasm     CXR 2/11/20  No active cardiopulmonary disease. No evidence of thoracic metastatic disease     CT Abd/Pelvis w/wo 7/15/2019  Result Impression   1. The enhancing right renal mass is stable. 2. Improving postsurgical changes status post left partial nephrectomy.  No residual/recurrent malignant disease. 3. Renal cysts and nonobstructing nephrolithiasis. 4. Stable borderline enlarged left periaortic lymph node since 2018, and may be reactive. No definite evidence for metastatic disease. 5. Stable right adrenal adenoma. 6. Stable small hepatic cysts. 7. Stable grade II L5/S1 anterolisthesis with L5 spondylolysis. Stable additional degenerative changes in the spine.         Kareen Gutierrez MD  [facility]  [address] [facility], [address] [facility]  Phone: [phone]  Pager: [phone]    History and physical review. The patient has been examined. There have been no significant clinical changes.     NAD, CTAB, RRR    Left Side marked  Gent On call to OR  Consented  Plan to proceed with Cysto, Left rpg, left ureteroscopy, LL, left stent    Kareen Gutierrez MD

## 2020-07-23 NOTE — ANESTHESIA POSTPROCEDURE EVALUATION
Procedure(s):  Cystoscopy, Left Retrograde Pyelogram, Left Ureteroscopy Laser Lithotripsy, Left Stent Placement. general    Anesthesia Post Evaluation      Multimodal analgesia: multimodal analgesia used between 6 hours prior to anesthesia start to PACU discharge  Patient location during evaluation: bedside  Patient participation: complete - patient participated  Level of consciousness: awake  Pain management: adequate  Airway patency: patent  Anesthetic complications: no  Cardiovascular status: stable  Respiratory status: acceptable  Hydration status: acceptable  Post anesthesia nausea and vomiting:  controlled      INITIAL Post-op Vital signs:   Vitals Value Taken Time   /81 7/23/2020  6:08 PM   Temp 37.1 °C (98.7 °F) 7/23/2020  5:53 PM   Pulse 64 7/23/2020  6:11 PM   Resp 20 7/23/2020  6:11 PM   SpO2 97 % 7/23/2020  6:11 PM   Vitals shown include unvalidated device data.

## 2020-07-24 NOTE — OP NOTES
[facility]  OPERATIVE REPORT    Name:  Krystian Jha  MR#:   [id number]  :  1950  ACCOUNT #:  [de-identified]  DATE OF SERVICE:  2020    PREOPERATIVE DIAGNOSIS:  Left kidney stone. POSTOPERATIVE DIAGNOSIS:  Left kidney stone. PROCEDURE PERFORMED:  1. Cystoscopy. 2.  Left retrograde pyelogram with intraoperative interpretation. 3.  Left ureteroscopy. 4.  Left laser lithotripsy. 5.  Left ureteral stent placement. SURGEON:  Eliud Mane MD    ASSISTANT:  Oksana Garcia MD    ANESTHESIA:  General.    FLUIDS:  Crystalloid. SPECIMENS REMOVED:  None. DRAINS:  A 6-Bangladeshi x 26 cm Bard double pigtail ureteral stent. ESTIMATED BLOOD LOSS:  Minimal.    INDICATION FOR THE PROCEDURE:  The patient is a 82-year-old male with a new 1 cm renal pelvis stone and a 3 mm left lower pole stone. The patient was asymptomatic. Risks, benefits, and alternatives were explained. The patient decided to proceed. INTRAOPERATIVE FINDINGS:  1. On cystourethroscopy, the patient had no bladder lesions. Ureteral orifices were in orthotopic position. No bladder lesions were noted. 2.  On left retrograde pyelogram with intraoperative interpretation, there was no hydronephrosis, filling defects, or extravasation. 3.  On left ureteropyeloscopy, there was a 1 cm and a 3 mm lower pole stone. These were completely fragmented into sub-millimeter pieces. Because of the caliber of the ureter, we did not remove a specimen for analysis. PROCEDURE:  The patient was first identified in the holding area and taken back to the operating room. Perioperative antibiotics were given. Sequential compression devices were placed. Anesthesia was induced. The patient was placed in dorsal lithotomy position, taking care to pad all pressure points. The patient was prepped and draped in the usual sterile fashion. Time-out was performed.     First, a 22-Bangladeshi rigid cystoscope was inserted into the patient's bladder. Systematic cystoscopy was performed. Next, a 0.038 wire was used to cannulate the left ureteral orifice up to the level of the renal pelvis. Over the wire, a 5-Surinamese open-ended catheter was advanced to the level of the renal pelvis. Wire was removed and aspirate was obtained. Clear yellow fluid was noted. Left retrograde pyelogram was performed through the 5-Surinamese. Findings as noted above. Wire was replaced. Open-ended catheter was removed in a push-pull technique. Next, over the wire, a dual-lumen catheter was advanced up to the level of the mid ureter. Amplatz Super Stiff wire was then advanced up to the level of the kidney. Dual-lumen catheter was removed. Sensor wire was secured to the drape, and over the Amplatz Super Stiff wire, a flexible ureteroscope was advanced up to the level of the renal pelvis. Nahomi Jean Baptiste was encountered. A 200 micron laser fiber was used to fragment the stones. Once this was done, we surveyed all calyces with dye to ensure that there was no stone of significance remaining. It should be noted that we did have some trouble maneuvering in the kidney because of the caliber of the ureter. Therefore, we decided not to remove a fragment. Safety wire was left in place and the flexible ureteroscope was removed. Under fluoroscopy, a 6-Surinamese x 26 cm stent was deployed. Good proximal and distal curls were noted. Bladder was drained. Case came to a conclusion. Plan will be for the patient to come back in two weeks with a KUB. We will pull the stent if there is no stone of significance noted along the ureter.       Lydia Warner MD      [id number]  D:  07/23/2020 17:57  T:  07/23/2020 23:27  JOB #:  [id number]

## 2020-08-31 PROBLEM — E27.8 ADRENAL HYPERPLASIA (HCC): Status: ACTIVE | Noted: 2020-02-06

## 2020-08-31 PROBLEM — D68.59 HYPERCOAGULABLE STATE (HCC): Status: ACTIVE | Noted: 2020-02-28

## 2020-08-31 PROBLEM — M17.10 PRIMARY LOCALIZED OSTEOARTHRITIS OF KNEE: Status: ACTIVE | Noted: 2020-02-27

## 2021-09-03 PROBLEM — I70.0 ATHEROSCLEROSIS OF AORTA (HCC): Status: ACTIVE | Noted: 2021-02-26

## 2021-09-03 PROBLEM — H35.363 DRUSEN (DEGENERATIVE) OF MACULA, BILATERAL: Status: ACTIVE | Noted: 2021-09-03

## 2021-09-03 PROBLEM — D50.9 IRON DEFICIENCY ANEMIA, UNSPECIFIED: Status: ACTIVE | Noted: 2021-03-17

## 2021-09-03 PROBLEM — H25.13 AGE-RELATED NUCLEAR CATARACT, BILATERAL: Status: ACTIVE | Noted: 2021-09-03

## (undated) DEVICE — KENDALL SCD EXPRESS SLEEVES, KNEE LENGTH, MEDIUM: Brand: KENDALL SCD

## (undated) DEVICE — SOLUTION IV 1000ML 0.9% SOD CHL

## (undated) DEVICE — TOWEL SURG W16XL26IN BLU NONFENESTRATED DLX ST 2 PER PK

## (undated) DEVICE — STERILE POLYISOPRENE POWDER-FREE SURGICAL GLOVES: Brand: PROTEXIS

## (undated) DEVICE — TRAY PREP DRY W/ PREM GLV 2 APPL 6 SPNG 2 UNDPD 1 OVERWRAP

## (undated) DEVICE — SPONGE GZ W4XL4IN COT 12 PLY TYP VII WVN C FLD DSGN

## (undated) DEVICE — SYR 20ML LL STRL LF --

## (undated) DEVICE — GDWIRE 3CM FLX-TIP 0.038X150CM -- BX/5 SENSOR

## (undated) DEVICE — BAG DRNGE NONSTERILE W/ SUCT HOSE CYSTO/UROLOGICAL FOR GE

## (undated) DEVICE — CHECK-FLO ADAPTER: Brand: CHECK-FLO

## (undated) DEVICE — FLEXOR, URETERAL ACCESS SHEATH WITH AQ, HYDROPHILIC COATING: Brand: FLEXOR

## (undated) DEVICE — CONTAINER DRN 20L DISP FLUIDRN LLS

## (undated) DEVICE — OPEN-END FLEXI-TIP URETERAL CATHETER: Brand: FLEXI-TIP

## (undated) DEVICE — SOLUTION IRRIG 3000ML 0.9% SOD CHL FLX CONT 0797208] ICU MEDICAL INC]

## (undated) DEVICE — CATH URET 5FRX70CM W/OPN END -- BX/20

## (undated) DEVICE — SNAP KOVER: Brand: UNBRANDED

## (undated) DEVICE — GUIDEWIRE: Brand: AMPLATZ SUPER STIFF

## (undated) DEVICE — Device